# Patient Record
Sex: MALE | Race: ASIAN | NOT HISPANIC OR LATINO | URBAN - METROPOLITAN AREA
[De-identification: names, ages, dates, MRNs, and addresses within clinical notes are randomized per-mention and may not be internally consistent; named-entity substitution may affect disease eponyms.]

---

## 2023-04-15 ENCOUNTER — EMERGENCY (EMERGENCY)
Facility: HOSPITAL | Age: 28
LOS: 1 days | Discharge: HOME | End: 2023-04-15
Attending: EMERGENCY MEDICINE
Payer: COMMERCIAL

## 2023-04-15 VITALS
RESPIRATION RATE: 18 BRPM | SYSTOLIC BLOOD PRESSURE: 123 MMHG | TEMPERATURE: 98 F | DIASTOLIC BLOOD PRESSURE: 79 MMHG | OXYGEN SATURATION: 98 % | HEART RATE: 71 BPM

## 2023-04-15 VITALS
DIASTOLIC BLOOD PRESSURE: 74 MMHG | TEMPERATURE: 97 F | OXYGEN SATURATION: 98 % | RESPIRATION RATE: 18 BRPM | HEIGHT: 68 IN | HEART RATE: 121 BPM | SYSTOLIC BLOOD PRESSURE: 150 MMHG | WEIGHT: 178.57 LBS

## 2023-04-15 PROCEDURE — 99053 MED SERV 10PM-8AM 24 HR FAC: CPT

## 2023-04-15 PROCEDURE — 71046 X-RAY EXAM CHEST 2 VIEWS: CPT

## 2023-04-15 PROCEDURE — 99285 EMERGENCY DEPT VISIT HI MDM: CPT

## 2023-04-15 PROCEDURE — 70486 CT MAXILLOFACIAL W/O DYE: CPT | Mod: MA

## 2023-04-15 PROCEDURE — 99284 EMERGENCY DEPT VISIT MOD MDM: CPT | Mod: 25

## 2023-04-15 PROCEDURE — 70486 CT MAXILLOFACIAL W/O DYE: CPT | Mod: 26,MA

## 2023-04-15 PROCEDURE — 70450 CT HEAD/BRAIN W/O DYE: CPT | Mod: 26,MA

## 2023-04-15 PROCEDURE — 70450 CT HEAD/BRAIN W/O DYE: CPT | Mod: MA

## 2023-04-15 PROCEDURE — 71046 X-RAY EXAM CHEST 2 VIEWS: CPT | Mod: 26

## 2023-04-15 RX ORDER — IBUPROFEN 200 MG
600 TABLET ORAL ONCE
Refills: 0 | Status: COMPLETED | OUTPATIENT
Start: 2023-04-15 | End: 2023-04-15

## 2023-04-15 RX ORDER — ACETAMINOPHEN WITH CODEINE 300MG-30MG
1 TABLET ORAL ONCE
Refills: 0 | Status: DISCONTINUED | OUTPATIENT
Start: 2023-04-15 | End: 2023-04-15

## 2023-04-15 RX ORDER — IBUPROFEN 200 MG
1 TABLET ORAL
Qty: 20 | Refills: 0
Start: 2023-04-15 | End: 2023-04-21

## 2023-04-15 RX ORDER — IBUPROFEN 200 MG
1 TABLET ORAL
Qty: 30 | Refills: 0
Start: 2023-04-15 | End: 2023-04-21

## 2023-04-15 RX ORDER — IBUPROFEN 200 MG
1 TABLET ORAL
Qty: 1 | Refills: 0
Start: 2023-04-15

## 2023-04-15 RX ADMIN — Medication 600 MILLIGRAM(S): at 05:50

## 2023-04-15 RX ADMIN — Medication 1 TABLET(S): at 01:36

## 2023-04-15 RX ADMIN — Medication 1 TABLET(S): at 05:41

## 2023-04-15 NOTE — ED PROVIDER NOTE - CARE PROVIDER_API CALL
Chandan Boone)  Otolaryngology  [address] [place] [address], [place] [address]  Phone: [phone]  Fax: [phone]  Follow Up Time:

## 2023-04-15 NOTE — ED ADULT NURSE NOTE - NSSEPSISSUSPECTED_ED_A_ED
"Encounter Date: 4/27/2018       History     Chief Complaint   Patient presents with    Abdominal Pain     RUQ abd pain since yesterday "constant"; denies N/V/D; unknown last BM     26-year-old male patient complains of rapid quadrant pain since last night.  No nausea and vomiting.  No fever.  Pain is cramping in worsens with intake of food.  No diarrhea.  No chest pain or shortness of breath.  No injury.      The history is provided by the patient.     Review of patient's allergies indicates:  No Known Allergies  History reviewed. No pertinent past medical history.  Past Surgical History:   Procedure Laterality Date    EYE SURGERY      EYE SURGERY       History reviewed. No pertinent family history.  Social History   Substance Use Topics    Smoking status: Former Smoker    Smokeless tobacco: Never Used    Alcohol use Yes      Comment: occ.     Review of Systems   Constitutional: Negative for activity change, appetite change and fever.   HENT: Negative for congestion and rhinorrhea.    Eyes: Negative for pain, discharge and itching.   Respiratory: Negative for cough, shortness of breath and wheezing.    Cardiovascular: Negative for chest pain and leg swelling.   Gastrointestinal: Positive for abdominal pain. Negative for abdominal distention, constipation, diarrhea, nausea and vomiting.   Genitourinary: Negative for dysuria, flank pain and frequency.   Musculoskeletal: Negative for back pain, gait problem, neck pain and neck stiffness.   Skin: Negative for rash.   Neurological: Negative for dizziness and headaches.   All other systems reviewed and are negative.      Physical Exam     Initial Vitals [04/27/18 0610]   BP Pulse Resp Temp SpO2   (!) 146/100 (!) 55 20 98 °F (36.7 °C) 99 %      MAP       115.33         Physical Exam    Nursing note and vitals reviewed.  Constitutional: He appears well-developed and well-nourished.   HENT:   Head: Normocephalic.   Right Ear: External ear normal.   Left Ear: External " ear normal.   Nose: Nose normal.   Mouth/Throat: Oropharynx is clear and moist.   Eyes: EOM are normal. Pupils are equal, round, and reactive to light.   Neck: Normal range of motion. Neck supple.   Cardiovascular: Normal rate, regular rhythm, normal heart sounds and intact distal pulses.   Pulmonary/Chest: Breath sounds normal. No respiratory distress. He has no wheezes. He has no rhonchi. He has no rales.   Abdominal: Soft. Bowel sounds are normal. He exhibits no distension. There is tenderness in the right upper quadrant. There is guarding. There is no rigidity.       Musculoskeletal: Normal range of motion.   Neurological: He is alert and oriented to person, place, and time. He has normal strength and normal reflexes. He displays normal reflexes. No cranial nerve deficit or sensory deficit.   Skin: Skin is warm. Capillary refill takes less than 2 seconds. No rash noted. No erythema.         ED Course   Procedures  Labs Reviewed   COMPREHENSIVE METABOLIC PANEL   CBC W/ AUTO DIFFERENTIAL   AMYLASE   LIPASE             Medical Decision Making:   Initial Assessment:   Patient presents with epigastric and right upper quadrant pain without any nausea and vomiting no fever.  Has not taken any pain medication.    Differential Diagnosis:   Appendicitis, gastritis, peptic ulcer, cholelithiasis, cystitis, esophageal reflux.  Clinical Tests:   Lab Tests: Ordered and Reviewed  ED Management:  Patient had normal blood tests for liver and pancreas.  His pain improved with Protonix and Bentyl.  He'll be given a prescription and advised to follow-up with primary care physician if pain persist greater than ultrasound.               Results discussed with patient.  Patient verbalizes understanding results,Diagnosis, treatment, management and prognosis.  Patient feels safe to go home.        Clinical Impression:   The encounter diagnosis was Dyspepsia.    Disposition:   Disposition: Discharged  Condition: Fair                         Alberto Carrion MD  04/27/18 1923     No

## 2023-04-15 NOTE — ED PROVIDER NOTE - CARE PLAN
Principal Discharge DX:	Nasal fracture  Secondary Diagnosis:	Periorbital contusion  Secondary Diagnosis:	Back contusion   1

## 2023-04-15 NOTE — ED PROVIDER NOTE - PHYSICAL EXAMINATION
No distress  Left periorbital and nasal bridge area ecchymosis and soft tissue swelling.    OD/OS 20/20  No increased fluorescein uptake to left eye  Extra ocular muscles intact, pupils 3mm and reactive to light, no photophobia, no pain with eye movement, no hyphema   No left fx  No dental trauma  No septal hematoma, no active epistaxis

## 2023-04-15 NOTE — ED PROVIDER NOTE - NSFOLLOWUPINSTRUCTIONS_ED_ALL_ED_FT
A fracture is a break in a bone. A nasal fracture is a broken nose. Minor breaks do not need treatment. They often heal on their own in about a month.    Serious breaks may need treatment. Sometimes, surgery is needed.    What are the causes?  This condition is usually caused by a direct hit to the nose. This often occurs from:  Playing a contact sport.  Being in a car accident.  Falling.  Getting punched in the face.  What are the signs or symptoms?  Pain.  Swelling of the nose.  Bleeding from the nose.  Bruises around the nose or eyes, including black eyes.  The nose having a crooked shape.  How is this treated?  Treatment depends on how bad the injury is.  Minor breaks may heal on their own. They often do not need treatment.  For more serious breaks that have caused bones to move out of position, treatment may include:  Numbing the nose area with medicines and moving the bones back into position without surgery. Your doctor may be able to do this in his or her office.  Surgery. If this is needed, it will be done after the swelling is gone.  Follow these instructions at home:  Activity    Return to your normal activities when your doctor says that it is safe.  Do not play contact sports for 3–4 weeks or as told by your doctor.  Managing pain and swelling    If told, put ice on the injured area. To do this:  Put ice in a plastic bag.  Place a towel between your skin and the bag.  Leave the ice on for 20 minutes, 2–3 times a day.  Take off the ice if your skin turns bright red. This is very important. If you cannot feel pain, heat, or cold, you have a greater risk of damage to the area.  General instructions    Bag of ice on a towel on the skin.   The correct and incorrect way to hold your head and fingers for first aid during a nosebleed.  Take over-the-counter and prescription medicines only as told by your doctor.  If your nose bleeds, sit up while you gently squeeze your nose shut for 10 minutes.  Try to not blow your nose.  Keep all follow-up visits.  Contact a doctor if:  You have more pain or very bad pain.  You keep having nosebleeds.  The shape of your nose does not return to normal after 5 days.  You have pus coming out of your nose.  Get help right away if:  Your nose bleeds for more than 20 minutes.  You have clear fluid draining out of your nose.  You have a swelling on the inside of your nose that does not get better.  You have trouble moving your eyes.  You keep vomiting.  These symptoms may be an emergency. Get help right away. Call 911.  Do not wait to see if the symptoms will go away.  Do not drive yourself to the hospital.  Summary  A nasal fracture is a broken nose.  Symptoms include pain, swelling, and bruising.  Minor breaks often do not require treatment. More serious breaks may require surgery or other treatments.  If your nose bleeds, sit up while you gently squeeze your nose shut for 10 minutes.  This information is not intended to replace advice given to you by your health care provider. Make sure you discuss any questions you have with your health care provider.

## 2023-04-15 NOTE — ED PROVIDER NOTE - PATIENT PORTAL LINK FT
You can access the FollowMyHealth Patient Portal offered by [facility] by registering at the following website: [email or web address]. By joining [facility]’s FollowMyHealth portal, you will also be able to view your health information using other applications (apps) compatible with our system.
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
## 2023-04-15 NOTE — ED PROVIDER NOTE - NSFOLLOWUPCLINICS_GEN_ALL_ED_FT
[facility]  Internal Medicine  [address] [place] [address]  Phone: [phone]  Fax: [phone]

## 2023-04-15 NOTE — ED PROVIDER NOTE - CLINICAL SUMMARY MEDICAL DECISION MAKING FREE TEXT BOX
CT Head: No acute intracranial hemorrhage or calvarial fracture.  CT maxillofacial: Bilateral nasal bone and anterior bony nasal septum   fractures with overall rightward deviation of the major fracture   fragments.  Intraorbital contents including the globes, extraocular muscles, and   optic nerves are intact. No retrobulbar hematoma.    CXR no ptx.    Pt is well appearing, has no new complaints and able to walk with normal gait. Pt is stable for discharge and follow up with medical doctor. Pt educated on care and need for follow up. Discussed anticipatory guidance and return precautions. Questions answered. I had a detailed discussion with the patient regarding the historical points, exam findings, and any diagnostic results supporting the discharge diagnosis.

## 2024-02-12 NOTE — ED PROVIDER NOTE - CROS ED SKIN ALL NEG
----- Message from Gallito Lino MD sent at 2/7/2024  4:59 PM CST -----  Office visit for diabetes   - - -
